# Patient Record
Sex: FEMALE | Race: BLACK OR AFRICAN AMERICAN | NOT HISPANIC OR LATINO | Employment: OTHER | URBAN - METROPOLITAN AREA
[De-identification: names, ages, dates, MRNs, and addresses within clinical notes are randomized per-mention and may not be internally consistent; named-entity substitution may affect disease eponyms.]

---

## 2019-07-02 PROBLEM — E03.9 ACQUIRED HYPOTHYROIDISM: Status: ACTIVE | Noted: 2017-09-06

## 2019-07-02 PROBLEM — E78.5 DYSLIPIDEMIA: Status: ACTIVE | Noted: 2017-09-06

## 2019-07-02 PROBLEM — E26.9 HYPERALDOSTERONISM: Status: ACTIVE | Noted: 2017-10-24

## 2019-08-05 PROBLEM — K11.8 PAROTID MASS: Status: ACTIVE | Noted: 2019-08-05

## 2019-08-05 PROBLEM — J34.89 RHINORRHEA: Status: ACTIVE | Noted: 2019-08-05

## 2022-02-18 PROBLEM — M26.649 TMJ ARTHRITIS: Status: ACTIVE | Noted: 2022-02-18

## 2022-02-18 PROBLEM — D11.0 PAROTID PLEOMORPHIC ADENOMA: Status: ACTIVE | Noted: 2022-02-18

## 2022-02-18 PROBLEM — K11.8 MASS OF LEFT PAROTID GLAND: Status: ACTIVE | Noted: 2022-02-18

## 2022-11-13 PROBLEM — S60.511A: Status: ACTIVE | Noted: 2022-11-13

## 2022-11-13 PROBLEM — M54.42 CHRONIC MIDLINE LOW BACK PAIN WITH BILATERAL SCIATICA: Status: ACTIVE | Noted: 2022-11-13

## 2022-11-13 PROBLEM — Z23 INFLUENZA VACCINE ADMINISTERED: Status: ACTIVE | Noted: 2022-11-13

## 2022-11-13 PROBLEM — Z76.89 ENCOUNTER TO ESTABLISH CARE: Status: ACTIVE | Noted: 2022-11-13

## 2022-11-13 PROBLEM — M79.604 PAIN IN BOTH LOWER EXTREMITIES: Status: ACTIVE | Noted: 2022-11-13

## 2022-11-13 PROBLEM — Z23 NEED FOR SHINGLES VACCINE: Status: ACTIVE | Noted: 2022-11-13

## 2022-11-13 PROBLEM — R79.9 ABNORMAL FINDING OF BLOOD CHEMISTRY, UNSPECIFIED: Status: ACTIVE | Noted: 2022-11-13

## 2022-11-13 PROBLEM — M54.41 CHRONIC MIDLINE LOW BACK PAIN WITH BILATERAL SCIATICA: Status: ACTIVE | Noted: 2022-11-13

## 2022-11-13 PROBLEM — G89.29 CHRONIC MIDLINE LOW BACK PAIN WITH BILATERAL SCIATICA: Status: ACTIVE | Noted: 2022-11-13

## 2022-11-13 PROBLEM — M79.605 PAIN IN BOTH LOWER EXTREMITIES: Status: ACTIVE | Noted: 2022-11-13

## 2022-11-14 ENCOUNTER — PATIENT OUTREACH (OUTPATIENT)
Dept: ADMINISTRATIVE | Facility: HOSPITAL | Age: 67
End: 2022-11-14

## 2022-11-28 ENCOUNTER — PATIENT OUTREACH (OUTPATIENT)
Dept: ADMINISTRATIVE | Facility: HOSPITAL | Age: 67
End: 2022-11-28

## 2023-03-03 PROBLEM — I1A.0 RESISTANT HYPERTENSION: Status: ACTIVE | Noted: 2017-09-06

## 2023-03-03 PROBLEM — R26.89 IMPAIRMENT OF BALANCE: Status: ACTIVE | Noted: 2023-03-03

## 2023-03-03 PROBLEM — I25.10 CORONARY ATHEROSCLEROSIS: Status: ACTIVE | Noted: 2023-03-03

## 2023-03-03 PROBLEM — M79.7 FIBROMYALGIA: Status: ACTIVE | Noted: 2023-03-03

## 2023-03-03 PROBLEM — R53.81 MALAISE AND FATIGUE: Status: ACTIVE | Noted: 2023-03-03

## 2023-03-03 PROBLEM — R10.11 RIGHT UPPER QUADRANT PAIN: Status: ACTIVE | Noted: 2023-03-03

## 2023-03-03 PROBLEM — I10 MALIGNANT ESSENTIAL HYPERTENSION: Status: ACTIVE | Noted: 2023-03-03

## 2023-03-03 PROBLEM — E78.01 FAMILIAL HYPERCHOLESTEROLEMIA: Status: ACTIVE | Noted: 2017-09-07

## 2023-03-03 PROBLEM — M79.609 PAIN IN LIMB: Status: ACTIVE | Noted: 2023-03-03

## 2023-03-03 PROBLEM — R60.9 EDEMA: Status: ACTIVE | Noted: 2023-03-03

## 2023-03-03 PROBLEM — M79.7 FIBROMYOSITIS: Status: ACTIVE | Noted: 2023-03-03

## 2023-03-03 PROBLEM — R07.9 CHEST PAIN: Status: ACTIVE | Noted: 2023-03-03

## 2023-03-03 PROBLEM — I10 BENIGN ESSENTIAL HYPERTENSION: Status: ACTIVE | Noted: 2023-03-03

## 2023-03-03 PROBLEM — I45.9 CONDUCTION DISORDER OF THE HEART: Status: ACTIVE | Noted: 2023-03-03

## 2023-03-03 PROBLEM — M19.049 LOCALIZED, PRIMARY OSTEOARTHRITIS OF HAND: Status: ACTIVE | Noted: 2023-03-03

## 2023-03-03 PROBLEM — K21.9 GASTROESOPHAGEAL REFLUX DISEASE: Status: ACTIVE | Noted: 2023-03-03

## 2023-03-03 PROBLEM — E87.6 HYPOKALEMIA: Status: ACTIVE | Noted: 2017-09-06

## 2023-03-03 PROBLEM — R53.83 FATIGUE: Status: ACTIVE | Noted: 2023-03-03

## 2023-03-03 PROBLEM — J30.9 ALLERGIC RHINITIS: Status: ACTIVE | Noted: 2023-03-03

## 2023-03-03 PROBLEM — R73.09 ABNORMAL GLUCOSE LEVEL: Status: ACTIVE | Noted: 2023-03-03

## 2023-03-03 PROBLEM — M25.50 JOINT PAIN: Status: ACTIVE | Noted: 2023-03-03

## 2023-03-03 PROBLEM — H53.129 TRANSIENT VISUAL LOSS: Status: ACTIVE | Noted: 2023-03-03

## 2023-03-03 PROBLEM — I25.10 CAD S/P PERCUTANEOUS CORONARY ANGIOPLASTY: Status: ACTIVE | Noted: 2017-09-06

## 2023-03-03 PROBLEM — M79.7 PRIMARY FIBROMYALGIA SYNDROME: Status: ACTIVE | Noted: 2023-03-03

## 2023-03-03 PROBLEM — E78.00 PURE HYPERCHOLESTEROLEMIA: Status: ACTIVE | Noted: 2023-03-03

## 2023-03-03 PROBLEM — E78.5 HYPERLIPIDEMIA: Status: ACTIVE | Noted: 2023-03-03

## 2023-03-03 PROBLEM — R06.02 SHORTNESS OF BREATH: Status: ACTIVE | Noted: 2023-03-03

## 2023-03-03 PROBLEM — M79.10 MYALGIA: Status: ACTIVE | Noted: 2017-10-24

## 2023-03-03 PROBLEM — Z98.61 CAD S/P PERCUTANEOUS CORONARY ANGIOPLASTY: Status: ACTIVE | Noted: 2017-09-06

## 2023-03-03 PROBLEM — J45.909 ASTHMA: Status: ACTIVE | Noted: 2023-03-03

## 2023-03-03 PROBLEM — I25.10 CORONARY ARTERIOSCLEROSIS IN NATIVE ARTERY: Status: ACTIVE | Noted: 2023-03-03

## 2023-03-03 PROBLEM — R53.83 MALAISE AND FATIGUE: Status: ACTIVE | Noted: 2023-03-03

## 2023-03-03 PROBLEM — E55.9 VITAMIN D DEFICIENCY: Status: ACTIVE | Noted: 2023-03-03

## 2023-03-03 PROBLEM — M25.561 PAIN IN RIGHT KNEE: Status: ACTIVE | Noted: 2023-03-03

## 2023-03-03 PROBLEM — M60.9 MYOSITIS: Status: ACTIVE | Noted: 2023-03-03

## 2023-03-03 PROBLEM — M99.09 SOMATIC DYSFUNCTION: Status: ACTIVE | Noted: 2023-03-03

## 2023-03-03 PROBLEM — G47.33 OBSTRUCTIVE SLEEP APNEA: Status: ACTIVE | Noted: 2023-03-03

## 2023-03-03 PROBLEM — G47.00 INSOMNIA: Status: ACTIVE | Noted: 2023-03-03

## 2023-03-03 PROBLEM — R41.3 MEMORY IMPAIRMENT: Status: ACTIVE | Noted: 2023-03-03

## 2023-04-26 PROBLEM — R94.4 DECREASED GFR: Status: ACTIVE | Noted: 2023-04-26

## 2023-04-26 PROBLEM — F41.9 ANXIETY AND DEPRESSION: Status: ACTIVE | Noted: 2023-04-26

## 2023-04-26 PROBLEM — F32.A ANXIETY AND DEPRESSION: Status: ACTIVE | Noted: 2023-04-26

## 2023-04-26 PROBLEM — F41.9 ANXIETY: Status: ACTIVE | Noted: 2023-04-26

## 2023-04-26 PROBLEM — R73.03 PREDIABETES: Status: ACTIVE | Noted: 2023-04-26

## 2023-04-26 PROBLEM — M79.18 MUSCULOSKELETAL PAIN: Status: ACTIVE | Noted: 2023-04-26

## 2023-04-26 PROBLEM — Z12.31 ENCOUNTER FOR SCREENING MAMMOGRAM FOR BREAST CANCER: Status: ACTIVE | Noted: 2023-04-26

## 2023-07-12 PROBLEM — R05.1 ACUTE COUGH: Status: ACTIVE | Noted: 2023-07-12

## 2023-07-12 PROBLEM — R10.84 GENERALIZED ABDOMINAL PAIN: Status: ACTIVE | Noted: 2023-07-12

## 2023-07-12 PROBLEM — J06.9 UPPER RESPIRATORY TRACT INFECTION: Status: ACTIVE | Noted: 2023-07-12

## 2024-02-15 PROBLEM — E66.9 OBESITY (BMI 30.0-34.9): Status: ACTIVE | Noted: 2024-02-15

## 2024-02-15 PROBLEM — K57.92 ACUTE DIVERTICULITIS OF INTESTINE: Status: ACTIVE | Noted: 2024-02-15

## 2024-02-22 ENCOUNTER — TELEPHONE (OUTPATIENT)
Dept: ADMINISTRATIVE | Facility: CLINIC | Age: 69
End: 2024-02-22

## 2024-02-22 NOTE — TELEPHONE ENCOUNTER
[facility] returned patient's call.  Patient requesting to schedule a HOSFU with her PCP.  [facility]3 nurse unable to schedule.  [facility]3 nurse sent message to staff of patient's PCP requesting they contact patient to schedule a HOSFU on or before, 2/27/2024.

## 2024-02-22 NOTE — TELEPHONE ENCOUNTER
First attempt to reach patient, left voicemail for patient to select option 5 if assistance is needed.

## 2024-04-08 PROBLEM — M79.10 MYALGIA: Status: RESOLVED | Noted: 2017-10-24 | Resolved: 2024-04-08

## 2024-04-08 PROBLEM — R94.4 DECREASED GFR: Status: RESOLVED | Noted: 2023-04-26 | Resolved: 2024-04-08

## 2024-04-08 PROBLEM — I10 BENIGN ESSENTIAL HYPERTENSION: Status: RESOLVED | Noted: 2023-03-03 | Resolved: 2024-04-08

## 2024-04-08 PROBLEM — M60.9 MYOSITIS: Status: RESOLVED | Noted: 2023-03-03 | Resolved: 2024-04-08

## 2024-04-08 PROBLEM — M79.7 FIBROMYOSITIS: Status: RESOLVED | Noted: 2023-03-03 | Resolved: 2024-04-08

## 2024-04-08 PROBLEM — Z23 NEED FOR SHINGLES VACCINE: Status: RESOLVED | Noted: 2022-11-13 | Resolved: 2024-04-08

## 2024-04-08 PROBLEM — Z23 INFLUENZA VACCINE ADMINISTERED: Status: RESOLVED | Noted: 2022-11-13 | Resolved: 2024-04-08

## 2024-04-08 PROBLEM — R05.1 ACUTE COUGH: Status: RESOLVED | Noted: 2023-07-12 | Resolved: 2024-04-08

## 2024-04-08 PROBLEM — R10.11 RIGHT UPPER QUADRANT PAIN: Status: RESOLVED | Noted: 2023-03-03 | Resolved: 2024-04-08

## 2024-04-08 PROBLEM — M25.561 PAIN IN RIGHT KNEE: Status: RESOLVED | Noted: 2023-03-03 | Resolved: 2024-04-08

## 2024-04-08 PROBLEM — M79.18 MUSCULOSKELETAL PAIN: Status: RESOLVED | Noted: 2023-04-26 | Resolved: 2024-04-08

## 2024-04-08 PROBLEM — M25.50 JOINT PAIN: Status: RESOLVED | Noted: 2023-03-03 | Resolved: 2024-04-08

## 2024-04-08 PROBLEM — M99.09 SOMATIC DYSFUNCTION: Status: RESOLVED | Noted: 2023-03-03 | Resolved: 2024-04-08

## 2024-04-08 PROBLEM — I10 MALIGNANT ESSENTIAL HYPERTENSION: Status: RESOLVED | Noted: 2023-03-03 | Resolved: 2024-04-08

## 2024-04-08 PROBLEM — E87.6 HYPOKALEMIA: Status: RESOLVED | Noted: 2017-09-06 | Resolved: 2024-04-08

## 2024-04-08 PROBLEM — M79.609 PAIN IN LIMB: Status: RESOLVED | Noted: 2023-03-03 | Resolved: 2024-04-08

## 2024-04-08 PROBLEM — J06.9 UPPER RESPIRATORY TRACT INFECTION: Status: RESOLVED | Noted: 2023-07-12 | Resolved: 2024-04-08

## 2024-04-08 PROBLEM — R10.84 GENERALIZED ABDOMINAL PAIN: Status: RESOLVED | Noted: 2023-07-12 | Resolved: 2024-04-08

## 2024-05-11 PROBLEM — J06.9 ACUTE UPPER RESPIRATORY INFECTION: Status: ACTIVE | Noted: 2024-05-11

## 2024-05-11 PROBLEM — M79.673 FOOT PAIN: Status: ACTIVE | Noted: 2024-05-11

## 2024-05-11 PROBLEM — M54.2 NECK PAIN: Status: ACTIVE | Noted: 2024-05-11

## 2024-05-11 PROBLEM — M79.606 PAIN IN LOWER LIMB: Status: ACTIVE | Noted: 2024-05-11

## 2024-05-11 PROBLEM — M79.2 NEURALGIA: Status: ACTIVE | Noted: 2024-05-11

## 2024-05-11 PROBLEM — R10.13 EPIGASTRIC PAIN: Status: ACTIVE | Noted: 2024-05-11

## 2024-05-11 PROBLEM — R51.9 HEADACHE: Status: ACTIVE | Noted: 2024-05-11

## 2024-05-11 PROBLEM — R51.9 GENERALIZED HEADACHE: Status: ACTIVE | Noted: 2024-05-11

## 2024-05-11 PROBLEM — H92.09 OTALGIA: Status: ACTIVE | Noted: 2024-05-11

## 2024-06-24 ENCOUNTER — PATIENT MESSAGE (OUTPATIENT)
Dept: ADMINISTRATIVE | Facility: HOSPITAL | Age: 69
End: 2024-06-24

## 2024-07-26 DIAGNOSIS — U07.1 COVID-19 VIRUS DETECTED: ICD-10-CM

## 2024-07-27 PROBLEM — E66.9 OBESITY, CLASS I, BMI 30-34.9: Status: ACTIVE | Noted: 2024-07-27

## 2024-07-27 PROBLEM — R29.818 ACUTE FOCAL NEUROLOGICAL DEFICIT: Status: ACTIVE | Noted: 2024-07-27

## 2024-07-27 PROBLEM — R29.90 STROKE-LIKE SYMPTOMS: Status: ACTIVE | Noted: 2024-07-27

## 2024-07-27 PROBLEM — I10 HYPERTENSION: Status: ACTIVE | Noted: 2024-07-27

## 2024-07-27 PROBLEM — I48.0 PAROXYSMAL ATRIAL FIBRILLATION: Status: ACTIVE | Noted: 2024-07-27

## 2024-07-27 PROBLEM — I25.10 CORONARY ARTERY DISEASE: Status: ACTIVE | Noted: 2024-07-27

## 2024-07-27 PROBLEM — E83.52 HYPERCALCEMIA: Status: ACTIVE | Noted: 2024-07-27

## 2024-07-27 PROBLEM — I48.20 CHRONIC ATRIAL FIBRILLATION: Status: ACTIVE | Noted: 2024-07-27

## 2024-07-27 PROBLEM — U07.1 COVID: Status: ACTIVE | Noted: 2024-07-27
